# Patient Record
Sex: MALE | Race: BLACK OR AFRICAN AMERICAN | Employment: OTHER | URBAN - METROPOLITAN AREA
[De-identification: names, ages, dates, MRNs, and addresses within clinical notes are randomized per-mention and may not be internally consistent; named-entity substitution may affect disease eponyms.]

---

## 2018-02-21 ENCOUNTER — OFFICE VISIT (OUTPATIENT)
Dept: UROLOGY | Age: 48
End: 2018-02-21

## 2018-02-21 ENCOUNTER — HOSPITAL ENCOUNTER (OUTPATIENT)
Dept: LAB | Age: 48
Discharge: HOME | End: 2018-02-21
Payer: SELF-PAY

## 2018-02-21 VITALS
HEIGHT: 66 IN | TEMPERATURE: 98.2 F | SYSTOLIC BLOOD PRESSURE: 125 MMHG | BODY MASS INDEX: 30.7 KG/M2 | WEIGHT: 191 LBS | OXYGEN SATURATION: 97 % | DIASTOLIC BLOOD PRESSURE: 84 MMHG | HEART RATE: 62 BPM

## 2018-02-21 DIAGNOSIS — R79.89 CREATININE ELEVATION: ICD-10-CM

## 2018-02-21 DIAGNOSIS — Z80.42 FAMILY HISTORY OF PROSTATE CANCER: ICD-10-CM

## 2018-02-21 DIAGNOSIS — R31.29 MICROSCOPIC HEMATURIA: Primary | ICD-10-CM

## 2018-02-21 LAB
BILIRUB UR QL STRIP: NEGATIVE
CREAT SERPL-MCNC: 1.36 MG/DL (ref 0.6–1.3)
GLUCOSE UR-MCNC: NEGATIVE MG/DL
KETONES P FAST UR STRIP-MCNC: NEGATIVE MG/DL
PH UR STRIP: 6 [PH] (ref 4.6–8)
PROT UR QL STRIP: NEGATIVE
SP GR UR STRIP: 1 (ref 1–1.03)
UA UROBILINOGEN AMB POC: NORMAL (ref 0.2–1)
URINALYSIS CLARITY POC: CLEAR
URINALYSIS COLOR POC: YELLOW
URINE BLOOD POC: NORMAL
URINE LEUKOCYTES POC: NEGATIVE
URINE NITRITES POC: NEGATIVE

## 2018-02-21 PROCEDURE — 82565 ASSAY OF CREATININE: CPT | Performed by: UROLOGY

## 2018-02-21 NOTE — PROGRESS NOTES
Jose Clark 56 y.o. male     Mr. Jeyson Pacheco seen today for evaluation of microscopic hematuria and family history of early onset prostate malignancy    Patient was found to have trace heme positive urine on routine employment physical  Patient has no irritative or obstructive voiding symptoms no history of  tract disease, or trauma or surgery  Father developed prostate cancer at age 76  Brother developed prostate cancer at age 62    PSA 1.3 on 23 December 2017    Creatinine 1.56 on 23 December 2017                  creatinine 1.2 in 2013    Renal ultrasound results are not available on care everywhere or chart review    Review of Systems:   CNS: No seizure syncope headaches dizziness visual changes  Respiratory: No wheezing shortness of breath chest pain or coughing  Cardiovascular: No palpitations no angina  Intestinal: No dyspepsia diarrhea or constipation  Urinary: No urgency frequency dysuria or hematuria  Skeletal: No bone or joint pain  Endocrine: No diabetes or thyroid disease  Other:                                                                     Works as a professional     Allergies: No Known Allergies   Medications:    Current Outpatient Prescriptions   Medication Sig Dispense Refill    OTHER Indications: Black seed oil         History reviewed. No pertinent past medical history. History reviewed. No pertinent surgical history.   Family History   Problem Relation Age of Onset    Cancer Mother     Cancer Father     Prostate Cancer Father     Heart Disease Father     Cancer Brother     Prostate Cancer Brother     Cancer Brother     Prostate Cancer Brother     Cancer Brother       Physical Examination: Male in no apparent distress    Abdomen is nontender no palpable masses no organomegaly  Back-no percussion CVA tenderness on either side  No inguinal hernia or adenopathy  Penis is normal  Testes are normal in size shape and consistency bilaterally-no epididymal induration or tenderness on either side  Spermatic cords are palpably normal bilaterally  Scrotum is normal  Prostate by OPAL is rounded, smooth, benign in consistency and nontender-no induration no nodularity  No rectal masses tenderness or induration      Urinalysis: Trace heme/microscopic negative for RBCs    Ultrasound imaging of the kidneys today in the office shows no signs of obstruction, mass lesions, cyst, or stone densities on either side        Impression: Normal urologic findings                        -History of elevated serum creatinine                        -Family history of early onset prostate malignancy    Heme positive urine on dipstick chemistries is a variation of normal and not indicative of urinary tract lesions-patient is asymptomatic from a urinary tract point of view  Ultrasound imaging of the kidneys today shows normal findings elevated creatinine obtained in-December is likely the result of dehydration and not the presence of renal parenchymal disease        Plan: Reassurance-patient is fit for full employment as a professional             Repeat creatinine today-telephone follow-up tomorrow            Yearly PSA testing is indicated because of family history of early onset prostate malignancy    rtc as needed symptoms of urinary tract disease      Sonia Ziegler MD  -electronically signed-    PLEASE NOTE:  This document has been produced using voice recognition software. Unrecognized errors in transcription may be present.

## 2018-02-21 NOTE — PATIENT INSTRUCTIONS

## 2018-02-21 NOTE — MR AVS SNAPSHOT
[address] [facility] [address] [facility] [address] 
[phone] Patient: Albin Silva MRN: [id number] BMM:5/90/3420 Visit Information Date & Time Provider Department Dept. Phone Encounter #  
 2/21/2018  1:00 PM Zohra Reeves, [address] [place] [address] [facility] [id number] Follow-up Instructions Return if symptoms worsen or fail to improve. Follow-up and Disposition History Upcoming Health Maintenance Date Due DTaP/Tdap/Td series (1 - Tdap) 8/30/1992 Influenza Age 5 to Adult 8/1/2017 Allergies as of 2/21/2018  Review Complete On: 2/21/2018 By: Zohra Reeves MD  
 No Known Allergies Current Immunizations  Never Reviewed No immunizations on file. Not reviewed this visit You Were Diagnosed With   
  
 Codes Comments Microscopic hematuria    -  Primary ICD-10-CM: R31.29 ICD-9-CM: 599.72 Family history of prostate cancer     ICD-10-CM: Z80.42 
ICD-9-CM: V16.42 Creatinine elevation     ICD-10-CM: R79.89 ICD-9-CM: 790.99 Vitals BP Pulse Temp Height(growth percentile) Weight(growth percentile) SpO2  
 125/84 (BP 1 Location: Left arm, BP Patient Position: Sitting) 62 98.2 °F (36.8 °C) 5' 6\" (1.676 m) 191 lb (86.6 kg) 97% BMI Smoking Status 30.83 kg/m2 Never Smoker BMI and BSA Data Body Mass Index Body Surface Area  
 30.83 kg/m 2 2.01 m 2 Preferred Pharmacy Pharmacy Name Phone [facility] [address] [phone] Your Updated Medication List  
  
   
This list is accurate as of 2/21/18  2:00 PM.  Always use your most recent med list.  
  
  
  
  
 OTHER Indications: Black seed oil We Performed the Following AMB POC URINALYSIS DIP STICK AUTO W/O MICRO [08073 CPT(R)] AMB POC US Adriana Bumps [id number] CPT(R)] COLLECTION VENOUS BLOOD,VENIPUNCTURE [id number] CPT(R)] Follow-up Instructions Return if symptoms worsen or fail to improve. Patient Instructions Blood in the Urine: Care Instructions Your Care Instructions Blood in the urine, or hematuria, may make the urine look red, brown, or pink. There may be blood every time you urinate or just from time to time. You cannot always see blood in the urine, but it will show up in a urine test. 
Blood in the urine may be serious. It should always be checked by a doctor. Your doctor may recommend more tests, including an X-ray, a CT scan, or a cystoscopy (which lets a doctor look inside the urethra and bladder). Blood in the urine can be a sign of another problem. Common causes are bladder infections and kidney stones. An injury to your groin or your genital area can also cause bleeding in the urinary tract. Very hard exercise-such as running a marathon-can cause blood in the urine. Blood in the urine can also be a sign of kidney disease or cancer in the bladder or kidney. Many cases of blood in the urine are caused by a harmless condition that runs in families. This is called benign familial hematuria. It does not need any treatment. Sometimes your urine may look red or brown even though it does not contain blood. For example, not getting enough fluids (dehydration), taking certain medicines, or having a liver problem can change the color of your urine. Eating foods such as beets, rhubarb, or blackberries or foods with red food coloring can make your urine look red or pink. Follow-up care is a key part of your treatment and safety. Be sure to make and go to all appointments, and call your doctor if you are having problems. It's also a good idea to know your test results and keep a list of the medicines you take. When should you call for help? Call your doctor now or seek immediate medical care if: · You have symptoms of a urinary infection. For example: ¨ You have pus in your urine. ¨ You have pain in your back just below your rib cage. This is called flank pain. ¨ You have a fever, chills, or body aches. ¨ It hurts to urinate. ¨ You have groin or belly pain. · You have more blood in your urine. Watch closely for changes in your health, and be sure to contact your doctor if: 
· You have new urination problems. · You do not get better as expected. Where can you learn more? Go to http://brendon-valeri.info/. Enter H480 in the search box to learn more about \"Blood in the Urine: Care Instructions. \" Current as of: May 12, 2017 Content Version: 11.4 © 2609-9957 DrAvailable. Care instructions adapted under license by [employer] (which disclaims liability or warranty for this information). If you have questions about a medical condition or this instruction, always ask your healthcare professional. [address] any warranty or liability for your use of this information. Patient Instructions History Introducing [facility]! Cherrie Gonzalez introduces [employer] patient portal. Now you can access parts of your medical record, email your doctor's office, and request medication refills online. 1. In your internet browser, go to [email or web address]. 2. Click on the First Time User? Click Here link in the Sign In box. You will see the New Member Sign Up page. 3. Enter your [employer] Access Code exactly as it appears below. You will not need to use this code after youve completed the sign-up process. If you do not sign up before the expiration date, you must request a new code. · [employer] Access Code: [facility] Expires: 5/22/2018  1:01 PM 
 
4. Enter the last four digits of your Social Security Number (xxxx) and Date of Birth (mm/dd/yyyy) as indicated and click Submit.  You will be taken to the next sign-up page. 5. Create a [employer] ID. This will be your [employer] login ID and cannot be changed, so think of one that is secure and easy to remember. 6. Create a [employer] password. You can change your password at any time. 7. Enter your Password Reset Question and Answer. This can be used at a later time if you forget your password. 8. Enter your e-mail address. You will receive e-mail notification when new information is available in [address] 9. Click Sign Up. You can now view and download portions of your medical record. 10. Click the Download Summary menu link to download a portable copy of your medical information. If you have questions, please visit the Frequently Asked Questions section of the [employer] website. Remember, [employer] is NOT to be used for urgent needs. For medical emergencies, dial 911. Now available from your iPhone and Android! Please provide this summary of care documentation to your next provider. Your primary care clinician is listed as Kaila Sanabria. If you have any questions after today's visit, please call [phone].

## 2018-02-21 NOTE — PROGRESS NOTES
Mr. El Martinez has a reminder for a \"due or due soon\" health maintenance. I have asked that he contact his primary care provider for follow-up on this health maintenance. RBV Per Dr. Freda Pike draw lab today for Creatinine for Elevated Creatinine.

## 2018-11-26 ENCOUNTER — OFFICE VISIT (OUTPATIENT)
Dept: PULMONOLOGY | Age: 48
End: 2018-11-26

## 2018-11-26 VITALS
DIASTOLIC BLOOD PRESSURE: 80 MMHG | WEIGHT: 193 LBS | HEIGHT: 66 IN | OXYGEN SATURATION: 97 % | HEART RATE: 80 BPM | TEMPERATURE: 98.6 F | RESPIRATION RATE: 19 BRPM | SYSTOLIC BLOOD PRESSURE: 130 MMHG | BODY MASS INDEX: 31.02 KG/M2

## 2018-11-26 DIAGNOSIS — R59.0 MEDIASTINAL ADENOPATHY: ICD-10-CM

## 2018-11-26 DIAGNOSIS — D86.9 SARCOIDOSIS: ICD-10-CM

## 2018-11-26 DIAGNOSIS — R06.02 SOB (SHORTNESS OF BREATH): Primary | ICD-10-CM

## 2018-11-26 RX ORDER — ALBUTEROL SULFATE 90 UG/1
2 AEROSOL, METERED RESPIRATORY (INHALATION)
Qty: 1 INHALER | Refills: 3 | Status: SHIPPED | OUTPATIENT
Start: 2018-11-26 | End: 2019-09-26 | Stop reason: SDUPTHER

## 2018-11-26 NOTE — PROGRESS NOTES
Chief Complaint Patient presents with  Sarcoidosis  
  referred by Roger Carvalho 1. Have you been to the ER, urgent care clinic since your last visit? Hospitalized since your last visit? No 
 
2. Have you seen or consulted any other health care providers outside of the [address] [place] [address] since your last visit? Include any pap smears or colon screening.  No

## 2018-11-26 NOTE — PROGRESS NOTES
HISTORY OF PRESENT ILLNESS Emi Caballero is a 53 y.o. male referred for shortness of breath. Pt carries a diagnosis of Sarcoidosis although he does not recall of any biopsies done. He started complaining of gradually progressive shortness of breath about 5 years ago, associated with a dry cough. SOB is episodic but is sometimes noted after walking up 2 flights of stairs. Pt denies wheezing or chest pain. No orthopnea, pedal edema or PND. Pt states he is fairly active but does not participate in a regular exercise program. He also admits to a 20 lb weight gain in the last 5 years. Outside records reviewed, pt had a CT in 2013 significant for mediastinal adenopathy and parenchymal nodules, see below. Review of Systems Constitutional: Positive for malaise/fatigue. Negative for chills, diaphoresis, fever and weight loss. Weight gain HENT: Negative for congestion, ear discharge, ear pain, hearing loss, nosebleeds, sinus pain, sore throat and tinnitus. Eyes: Negative for blurred vision, double vision, photophobia, pain, discharge and redness. Respiratory: Positive for shortness of breath. Negative for cough, hemoptysis, sputum production, wheezing and stridor. Cardiovascular: Negative for chest pain, palpitations, orthopnea, claudication, leg swelling and PND. Gastrointestinal: Negative for abdominal pain, blood in stool, constipation, diarrhea, heartburn, melena, nausea and vomiting. Genitourinary: Negative for dysuria, flank pain, frequency, hematuria and urgency. Musculoskeletal: Negative for back pain, falls, joint pain, myalgias and neck pain. Skin: Negative for itching and rash. Neurological: Negative for dizziness, tingling, tremors, sensory change, speech change, focal weakness, seizures, loss of consciousness, weakness and headaches. Endo/Heme/Allergies: Negative for environmental allergies and polydipsia. Does not bruise/bleed easily. Psychiatric/Behavioral: Negative for depression, hallucinations, memory loss, substance abuse and suicidal ideas. The patient is not nervous/anxious and does not have insomnia. Physical Exam  
Constitutional: He is oriented to person, place, and time. He appears well-developed. No distress. Overweight HENT:  
Head: Normocephalic and atraumatic. Nose: Nose normal.  
Mouth/Throat: Oropharynx is clear and moist. No oropharyngeal exudate. Eyes: Conjunctivae and EOM are normal. Pupils are equal, round, and reactive to light. Right eye exhibits no discharge. Left eye exhibits no discharge. No scleral icterus. Neck: Normal range of motion. No JVD present. No tracheal deviation present. No thyromegaly present. Cardiovascular: Normal rate, regular rhythm, normal heart sounds and intact distal pulses. Exam reveals no gallop and no friction rub. No murmur heard. Pulmonary/Chest: Breath sounds normal. No stridor. No respiratory distress. He has no wheezes. He has no rales. He exhibits no tenderness. Abdominal: Soft. He exhibits no mass. There is no tenderness. There is no rebound. Musculoskeletal: He exhibits no edema, tenderness or deformity. Lymphadenopathy:  
  He has no cervical adenopathy. Neurological: He is alert and oriented to person, place, and time. Coordination normal.  
Skin: Skin is warm and dry. No rash noted. He is not diaphoretic. No erythema. No pallor. Psychiatric: He has a normal mood and affect. His behavior is normal. Judgment and thought content normal.  
  
CT CHEST W/O CONTRAST9/24/2013 [facility] Result Impression Impression: 1. Multiple thoracic inlet, mediastinum, and bilateral hilar lymphadenopathy as described above. 2. Multiple scattered pulmonary nodules in both lung fields more in the right lung. 3. Tree on bud appearance  neovascularization and miliary nodules in the right middle and lower lobes. Possible etiologies include sarcoidosis, lymphoma, granulomatous disease such as TB and fungal infection. Clinical correlation is recommended. Result Narrative History:44year-old male with history of enlarged lymph nodes. CPT [id number] Comparison:Chest x-ray of  8/14/13. Technique: Helical scan was performed through the chest without contrast.  Coronal and sagittal reconstructions were performed. LVC:385.0. Findings: Lack of intravenous contrast lessens sensitivity for detecting hilar and mediastinal adenopathy.  .   
 
The thyroid is normal in size and density. There is a right supraclavicular lymph node measuring 1.1 x 1 cm.  Series 2 image #1. Multiple thoracic inlet and mediastinum lymphadenopathy. The largest pretracheal lymph node measures 2.5 x 2.2 x 4.1 cm in AP, transverse and CC dimension.  In axial and sagittal view. The combination of subcarinal lymph node measures 2.4 x 2.7 cm. The largest of the pulmonary window lymph node measures 1.5 x 1.9 cm. Multiple hilar lymph nodes not well defined due to lack of IV contrast.   
There are multiple tiny pulmonary nodules scattered in the lung field. Right upper lobe pulmonary nodules: In series 3 image #20 measures 6.5 x 4.8 mm. In image #21 measures 5 mm and there are two 5 mm lymph nodes in the mediastinum region in right upper lobe image #26. The largest right lower lobe nodule measures 6.9 x 11 mm in image #28. There are generalized tree on bud neovascularization in the right middle and upper lobes with multiple miliary nodules. There is patchy atelectasis in the right lower lobe costophrenic region. The nodules in the left lung has small and the left lung field is clear. No pneumothorax, no pleural effusion. The heart size is normal.  The aorta and pulmonary arteries are normal in size.  
The bone skeleton is normal. 
 
The visualized liver, spleen, pancreas, adrenal glands, and superior aspect of the kidneys are normal. 
 No hiatal hernia. Status Results Details  
    
 
  
PFT: normal flows with normal FEV1 ratio. FEV1 increased by 13% after bronchodilator. Normal lung volume and DLCO Chest PA L personal review: no acute infiltrates, perihilar prominence ASSESSMENT and PLAN Encounter Diagnoses Name Primary?  SOB (shortness of breath) Yes  Sarcoidosis  Mediastinal adenopathy Shortness of breath of unclear etiology however spirometry does reveal significant bronchodilator response despite normal flows. Pt may have bronchospasm, whether this is associated with Sarcoidosis. Although pt carries a diagnosis of Sarcoidosis, I do not see evidence of a tissue diagnosis. Although CT changes could be explained by this, other DDx also include fungal or other granulomatous disease. Discussed diagnosis and indications for treatment of Sarcoidosis, pt is reluctant to undergo any type of biopsy at this time. He does agree to have a CT chest to follow up on above changes. Order written, see below. Will call pt with CT results. Will start pt on prn Albuterol. Rx written and pt instructed on inhaler use. RTC in 3 months.

## 2018-11-28 ENCOUNTER — HOSPITAL ENCOUNTER (OUTPATIENT)
Dept: CT IMAGING | Age: 48
Discharge: HOME | End: 2018-11-28
Attending: INTERNAL MEDICINE
Payer: SELF-PAY

## 2018-11-28 DIAGNOSIS — R59.0 MEDIASTINAL ADENOPATHY: ICD-10-CM

## 2018-11-28 LAB — CREAT UR-MCNC: 1.5 MG/DL (ref 0.6–1.3)

## 2018-11-28 PROCEDURE — 71260 CT THORAX DX C+: CPT

## 2018-11-28 PROCEDURE — 74011636320 HC RX REV CODE- 636/320: Performed by: INTERNAL MEDICINE

## 2018-11-28 PROCEDURE — 82565 ASSAY OF CREATININE: CPT

## 2018-11-28 RX ADMIN — IOPAMIDOL 80 ML: 612 INJECTION, SOLUTION INTRAVENOUS at 07:31

## 2019-02-21 ENCOUNTER — OFFICE VISIT (OUTPATIENT)
Dept: PULMONOLOGY | Age: 49
End: 2019-02-21

## 2019-02-21 ENCOUNTER — HOSPITAL ENCOUNTER (OUTPATIENT)
Dept: LAB | Age: 49
Discharge: HOME | End: 2019-02-21
Payer: SELF-PAY

## 2019-02-21 VITALS
OXYGEN SATURATION: 96 % | BODY MASS INDEX: 31.34 KG/M2 | WEIGHT: 195 LBS | DIASTOLIC BLOOD PRESSURE: 78 MMHG | RESPIRATION RATE: 18 BRPM | TEMPERATURE: 98.1 F | HEIGHT: 66 IN | SYSTOLIC BLOOD PRESSURE: 110 MMHG | HEART RATE: 63 BPM

## 2019-02-21 DIAGNOSIS — R53.83 FATIGUE, UNSPECIFIED TYPE: ICD-10-CM

## 2019-02-21 DIAGNOSIS — R91.1 LUNG NODULE: ICD-10-CM

## 2019-02-21 DIAGNOSIS — R53.83 FATIGUE, UNSPECIFIED TYPE: Primary | ICD-10-CM

## 2019-02-21 LAB
ALBUMIN SERPL-MCNC: 4.3 G/DL (ref 3.4–5)
ALBUMIN/GLOB SERPL: 1.3 {RATIO} (ref 0.8–1.7)
ALP SERPL-CCNC: 55 U/L (ref 45–117)
ALT SERPL-CCNC: 56 U/L (ref 16–61)
ANION GAP SERPL CALC-SCNC: 2 MMOL/L (ref 3–18)
AST SERPL-CCNC: 25 U/L (ref 15–37)
BASOPHILS # BLD: 0 K/UL (ref 0–0.1)
BASOPHILS NFR BLD: 1 % (ref 0–2)
BILIRUB SERPL-MCNC: 0.6 MG/DL (ref 0.2–1)
BUN SERPL-MCNC: 14 MG/DL (ref 7–18)
BUN/CREAT SERPL: 10 (ref 12–20)
CALCIUM SERPL-MCNC: 9.3 MG/DL (ref 8.5–10.1)
CHLORIDE SERPL-SCNC: 104 MMOL/L (ref 100–108)
CO2 SERPL-SCNC: 32 MMOL/L (ref 21–32)
CREAT SERPL-MCNC: 1.4 MG/DL (ref 0.6–1.3)
DIFFERENTIAL METHOD BLD: ABNORMAL
EOSINOPHIL # BLD: 0.1 K/UL (ref 0–0.4)
EOSINOPHIL NFR BLD: 2 % (ref 0–5)
ERYTHROCYTE [DISTWIDTH] IN BLOOD BY AUTOMATED COUNT: 13.9 % (ref 11.6–14.5)
GLOBULIN SER CALC-MCNC: 3.2 G/DL (ref 2–4)
GLUCOSE SERPL-MCNC: 86 MG/DL (ref 74–99)
HCT VFR BLD AUTO: 46.9 % (ref 36–48)
HGB BLD-MCNC: 15.9 G/DL (ref 13–16)
LYMPHOCYTES # BLD: 1.9 K/UL (ref 0.9–3.6)
LYMPHOCYTES NFR BLD: 45 % (ref 21–52)
MCH RBC QN AUTO: 27.4 PG (ref 24–34)
MCHC RBC AUTO-ENTMCNC: 33.9 G/DL (ref 31–37)
MCV RBC AUTO: 80.9 FL (ref 74–97)
MONOCYTES # BLD: 0.3 K/UL (ref 0.05–1.2)
MONOCYTES NFR BLD: 7 % (ref 3–10)
NEUTS SEG # BLD: 1.9 K/UL (ref 1.8–8)
NEUTS SEG NFR BLD: 45 % (ref 40–73)
PLATELET # BLD AUTO: 231 K/UL (ref 135–420)
PMV BLD AUTO: 10.4 FL (ref 9.2–11.8)
POTASSIUM SERPL-SCNC: 4.9 MMOL/L (ref 3.5–5.5)
PROT SERPL-MCNC: 7.5 G/DL (ref 6.4–8.2)
RBC # BLD AUTO: 5.8 M/UL (ref 4.7–5.5)
SODIUM SERPL-SCNC: 138 MMOL/L (ref 136–145)
WBC # BLD AUTO: 4.1 K/UL (ref 4.6–13.2)

## 2019-02-21 PROCEDURE — 36415 COLL VENOUS BLD VENIPUNCTURE: CPT

## 2019-02-21 PROCEDURE — 80053 COMPREHEN METABOLIC PANEL: CPT

## 2019-02-21 PROCEDURE — 85025 COMPLETE CBC W/AUTO DIFF WBC: CPT

## 2019-02-21 NOTE — PROGRESS NOTES
HISTORY OF PRESENT ILLNESS Sivakumar Terrell is a 53 y.o. male referred for shortness of breath. Pt carries a diagnosis of Sarcoidosis although he does not recall of any biopsies done. Pt was told of Sarcoidosis after complaining of gradually progressive shortness of breath about 5 years ago, associated with a dry cough. CT done then showed mediastinal adenopathy. SOB was episodic but sometimes noted after walking up 2 flights of stairs. Pt denied wheezing or chest pain. All these symptoms resolved with time. No orthopnea, pedal edema or PND. Pt states he is fairly active but does not participate in a regular exercise program. He also admits to a 20 lb weight gain in the last 5 years. Repeat CT was done, see below. Pt does complain of easy fatigue without actual SOB or wheezing. Review of records show subtle increase in Creatinine over time. Review of Systems Constitutional: Positive for malaise/fatigue. Negative for chills, diaphoresis, fever and weight loss. Weight gain HENT: Negative for congestion, ear discharge, ear pain, hearing loss, nosebleeds, sinus pain, sore throat and tinnitus. Eyes: Negative for blurred vision, double vision, photophobia, pain, discharge and redness. Respiratory: Positive for shortness of breath. Negative for cough, hemoptysis, sputum production, wheezing and stridor. Cardiovascular: Negative for chest pain, palpitations, orthopnea, claudication, leg swelling and PND. Gastrointestinal: Negative for abdominal pain, blood in stool, constipation, diarrhea, heartburn, melena, nausea and vomiting. Genitourinary: Negative for dysuria, flank pain, frequency, hematuria and urgency. Musculoskeletal: Negative for back pain, falls, joint pain, myalgias and neck pain. Skin: Negative for itching and rash.   
Neurological: Negative for dizziness, tingling, tremors, sensory change, speech change, focal weakness, seizures, loss of consciousness, weakness and headaches. Endo/Heme/Allergies: Negative for environmental allergies and polydipsia. Does not bruise/bleed easily. Psychiatric/Behavioral: Negative for depression, hallucinations, memory loss, substance abuse and suicidal ideas. The patient is not nervous/anxious and does not have insomnia. Past Medical History:  
Diagnosis Date  Sarcoidosis History reviewed. No pertinent surgical history. Current Outpatient Medications on File Prior to Visit Medication Sig Dispense Refill  albuterol (PROVENTIL HFA, VENTOLIN HFA, PROAIR HFA) 90 mcg/actuation inhaler Take 2 Puffs by inhalation every six (6) hours as needed for Wheezing. 1 Inhaler 3  
 OTHER Indications: Black seed oil No current facility-administered medications on file prior to visit. No Known Allergies Family History Problem Relation Age of Onset  Cancer Mother  Cancer Father  Prostate Cancer Father  Heart Disease Father  Cancer Brother  Prostate Cancer Brother  Cancer Brother  Prostate Cancer Brother  Cancer Brother Social History Socioeconomic History  Marital status:  Spouse name: Not on file  Number of children: Not on file  Years of education: Not on file  Highest education level: Not on file Social Needs  Financial resource strain: Not on file  Food insecurity - worry: Not on file  Food insecurity - inability: Not on file  Transportation needs - medical: Not on file  Transportation needs - non-medical: Not on file Occupational History  Not on file Tobacco Use  Smoking status: Never Smoker  Smokeless tobacco: Never Used Substance and Sexual Activity  Alcohol use: No  
 Drug use: No  
 Sexual activity: Yes Other Topics Concern  Not on file Social History Narrative ** Merged History Encounter ** Blood pressure 110/78, pulse 63, temperature 98.1 °F (36.7 °C), temperature source Oral, resp. rate 18, height 5' 6\" (1.676 m), weight 88.5 kg (195 lb), SpO2 96 %. Physical Exam  
Constitutional: He is oriented to person, place, and time. He appears well-developed. No distress. Overweight HENT:  
Head: Normocephalic and atraumatic. Nose: Nose normal.  
Mouth/Throat: Oropharynx is clear and moist. No oropharyngeal exudate. Eyes: Conjunctivae and EOM are normal. Pupils are equal, round, and reactive to light. Right eye exhibits no discharge. Left eye exhibits no discharge. No scleral icterus. Neck: Normal range of motion. No JVD present. No tracheal deviation present. No thyromegaly present. Cardiovascular: Normal rate, regular rhythm, normal heart sounds and intact distal pulses. Exam reveals no gallop and no friction rub. No murmur heard. Pulmonary/Chest: Breath sounds normal. No stridor. No respiratory distress. He has no wheezes. He has no rales. He exhibits no tenderness. Abdominal: Soft. He exhibits no mass. There is no tenderness. There is no rebound and no guarding. Musculoskeletal: He exhibits no edema, tenderness or deformity. Lymphadenopathy:  
  He has no cervical adenopathy. Neurological: He is alert and oriented to person, place, and time. Coordination normal.  
Skin: Skin is warm and dry. No rash noted. He is not diaphoretic. No erythema. No pallor. Psychiatric: He has a normal mood and affect. His behavior is normal. Judgment and thought content normal.  
  
CT CHEST W/O CONTRAST9/24/2013 [facility] Result Impression Impression: 1. Multiple thoracic inlet, mediastinum, and bilateral hilar lymphadenopathy as described above. 2. Multiple scattered pulmonary nodules in both lung fields more in the right lung. 3. Tree on bud appearance  neovascularization and miliary nodules in the right middle and lower lobes. Possible etiologies include sarcoidosis, lymphoma, granulomatous disease such as TB and fungal infection. Clinical correlation is recommended. Result Narrative History:44year-old male with history of enlarged lymph nodes. CPT [id number] Comparison:Chest x-ray of  8/14/13. Technique: Helical scan was performed through the chest without contrast.  Coronal and sagittal reconstructions were performed. IYM:802.4. Findings: Lack of intravenous contrast lessens sensitivity for detecting hilar and mediastinal adenopathy.  .   
 
The thyroid is normal in size and density. There is a right supraclavicular lymph node measuring 1.1 x 1 cm.  Series 2 image #1. Multiple thoracic inlet and mediastinum lymphadenopathy. The largest pretracheal lymph node measures 2.5 x 2.2 x 4.1 cm in AP, transverse and CC dimension.  In axial and sagittal view. The combination of subcarinal lymph node measures 2.4 x 2.7 cm. The largest of the pulmonary window lymph node measures 1.5 x 1.9 cm. Multiple hilar lymph nodes not well defined due to lack of IV contrast.   
There are multiple tiny pulmonary nodules scattered in the lung field. Right upper lobe pulmonary nodules: In series 3 image #20 measures 6.5 x 4.8 mm. In image #21 measures 5 mm and there are two 5 mm lymph nodes in the mediastinum region in right upper lobe image #26. The largest right lower lobe nodule measures 6.9 x 11 mm in image #28. There are generalized tree on bud neovascularization in the right middle and upper lobes with multiple miliary nodules. There is patchy atelectasis in the right lower lobe costophrenic region. The nodules in the left lung has small and the left lung field is clear. No pneumothorax, no pleural effusion. The heart size is normal.  The aorta and pulmonary arteries are normal in size. The bone skeleton is normal. 
 
The visualized liver, spleen, pancreas, adrenal glands, and superior aspect of the kidneys are normal. 
No hiatal hernia.   
Status Results Details  
    
 
  
CT Results (most recent): 
 Results from Hospital Encounter encounter on 11/28/18 CT CHEST W CONT Narrative EXAM: CT scan of the chest with contrast. 
 
CLINICAL HISTORY/INDICATION: Mediastinal adenopathy. Beverly Thakkar COMPARISON: None. TECHNIQUE: All CT scans at this facility are performed using dose optimization 
technique as appropriate to a performed exam, to include automated exposure 
control, adjustment of the mA and/or kV according to patient's size (including 
appropriate matching for site-specific examinations), or use of iterative 
reconstruction technique. Beverly Thakkar FINDINGS: 
 
A CT scan of the patient's chest is performed with multiple axial images from 
the thoracic inlet to the costophrenic angles. Intravenous contrast material 
utilized. There is normal opacification of the vascular structures of the 
mediastinum. No evidence for mediastinal or hilar adenopathy is present. The 
caliber of the thoracic aorta is within normal limits. No aneurysm or dissection 
is observed. The heart is normal in size. No pericardial effusion is seen. The 
partially visualized upper abdominal organs are unremarkable. The lobes of the 
thyroid gland appears symmetric. Lung window images reveals in the periphery of 
the right lung on image #9 a 4 mm nodule. On image #30 in the periphery of the 
right lung is a 2 mm nodule. No other nodules infiltrates pleural effusions or 
pneumothoraces are seen. Bone windows show no evidence for a destructive bony 
lesion. Beverly Thakkar Impression IMPRESSION: 
 
No evidence for mediastinal or hilar adenopathy. 2 small less than 4 mm nodules noted in the right lung. No infiltrates pleural effusions or pneumothoraces. The osseous structures are intact. Beverly Thakkar PFT: normal flows with normal FEV1 ratio. FEV1 increased by 13% after bronchodilator. Normal lung volume and DLCO Chest PA L personal review: no acute infiltrates, perihilar prominence ASSESSMENT and PLAN Encounter Diagnoses Name Primary?  Fatigue, unspecified type Yes  Lung nodule Shortness of breath of unclear etiology however spirometry does reveal significant bronchodilator response despite normal flows. Pt may have bronchospasm, ?undiagnosed Asthma. Although pt carries a diagnosis of Sarcoidosis, I do not see evidence of a tissue diagnosis. In addition, adenopathy has resolved on repeat CT and may have been due to infection rather than Sarcoidosis. Discussed diagnosis and indications for treatment of Sarcoidosis, pt is still reluctant to undergo any type of biopsy. Will check CBC and CMP to investigate possible causes of easy fatigue. Will continue prn Albuterol. RTC in 3 months. Will call pt with results of bloodwork.

## 2019-02-21 NOTE — PROGRESS NOTES
Chief Complaint Patient presents with  Sarcoidosis  
  follow up form 11/26/2018; CT 11/28/2018  Shortness of Breath  Other  
  mediastinal adenopathy 1. Have you been to the ER, urgent care clinic since your last visit? Hospitalized since your last visit? No 
 
2. Have you seen or consulted any other health care providers outside of the [address] [place] [address] since your last visit? Include any pap smears or colon screening.  No

## 2019-03-14 NOTE — LETTER
11/26/2018 5:05 PM 
 
Patient:  Ana Rosa Palmer YOB: 1970 Date of Visit: 11/26/2018 Dear Ruth Navarro MD 
[address] [place] [address] VIA Facsimile: [phone] 
 : Thank you for referring Mr. Fuentes Cohn to me for evaluation/treatment. Attached is my note with the relevant portions of my assessment and plan of care. If you have questions, please do not hesitate to call me. I look forward to following Mr. Chente Fry along with you. Sincerely, Roberta Man MD 
 
 Patient arrived to IR procedure room 2.

## 2019-09-26 ENCOUNTER — OFFICE VISIT (OUTPATIENT)
Dept: PULMONOLOGY | Age: 49
End: 2019-09-26

## 2019-09-26 ENCOUNTER — HOSPITAL ENCOUNTER (OUTPATIENT)
Dept: LAB | Age: 49
Discharge: HOME | End: 2019-09-26
Payer: SELF-PAY

## 2019-09-26 VITALS
DIASTOLIC BLOOD PRESSURE: 90 MMHG | WEIGHT: 188 LBS | TEMPERATURE: 98.7 F | HEART RATE: 60 BPM | SYSTOLIC BLOOD PRESSURE: 147 MMHG | HEIGHT: 66 IN | OXYGEN SATURATION: 99 % | RESPIRATION RATE: 17 BRPM | BODY MASS INDEX: 30.22 KG/M2

## 2019-09-26 DIAGNOSIS — R59.0 HILAR ADENOPATHY: ICD-10-CM

## 2019-09-26 DIAGNOSIS — I10 ESSENTIAL HYPERTENSION: ICD-10-CM

## 2019-09-26 DIAGNOSIS — N18.9 CHRONIC KIDNEY DISEASE, UNSPECIFIED CKD STAGE: ICD-10-CM

## 2019-09-26 DIAGNOSIS — J98.01 BRONCHOSPASM: Primary | ICD-10-CM

## 2019-09-26 DIAGNOSIS — Z23 ENCOUNTER FOR IMMUNIZATION: ICD-10-CM

## 2019-09-26 LAB
ANION GAP SERPL CALC-SCNC: 7 MMOL/L (ref 3–18)
BUN SERPL-MCNC: 10 MG/DL (ref 7–18)
BUN/CREAT SERPL: 8 (ref 12–20)
CALCIUM SERPL-MCNC: 9.6 MG/DL (ref 8.5–10.1)
CHLORIDE SERPL-SCNC: 107 MMOL/L (ref 100–111)
CO2 SERPL-SCNC: 27 MMOL/L (ref 21–32)
CREAT SERPL-MCNC: 1.27 MG/DL (ref 0.6–1.3)
GLUCOSE SERPL-MCNC: 98 MG/DL (ref 74–99)
POTASSIUM SERPL-SCNC: 4.1 MMOL/L (ref 3.5–5.5)
SODIUM SERPL-SCNC: 141 MMOL/L (ref 136–145)

## 2019-09-26 PROCEDURE — 36415 COLL VENOUS BLD VENIPUNCTURE: CPT

## 2019-09-26 PROCEDURE — 80048 BASIC METABOLIC PNL TOTAL CA: CPT

## 2019-09-26 RX ORDER — ALBUTEROL SULFATE 90 UG/1
2 AEROSOL, METERED RESPIRATORY (INHALATION)
Qty: 1 INHALER | Refills: 5 | Status: SHIPPED | OUTPATIENT
Start: 2019-09-26

## 2019-09-26 RX ORDER — FLUTICASONE FUROATE AND VILANTEROL 100; 25 UG/1; UG/1
1 POWDER RESPIRATORY (INHALATION) DAILY
Qty: 2 INHALER | Refills: 0 | Status: SHIPPED | COMMUNITY
Start: 2019-09-26

## 2019-09-26 NOTE — PROGRESS NOTES
HISTORY OF PRESENT ILLNESS  Anish Clark is a 51 y.o. male referred for shortness of breath. Pt was diagnosed with Sarcoidosis years ago although he does not recall of any biopsies done. Pt was told of Sarcoidosis after complaining of gradually progressive shortness of breath about 5 years ago, associated with a dry cough. CT done then showed mediastinal adenopathy. Repeat CT showed resolution of adenopathy. SOB was episodic but sometimes noted after walking up 2 flights of stairs. Pt denied wheezing or chest pain. All these symptoms improved with rest and Albuterol which pt uses at least once daily. Pt does complain of easy fatigue and shortness of breath with exertion. Review of records show subtle increase in Creatinine over time. Review of Systems   Constitutional: Positive for malaise/fatigue. Negative for chills, diaphoresis, fever and weight loss. Weight gain   HENT: Negative for congestion, ear discharge, ear pain, hearing loss, nosebleeds, sinus pain, sore throat and tinnitus. Eyes: Negative for blurred vision, double vision, photophobia, pain, discharge and redness. Respiratory: Positive for shortness of breath. Negative for cough, hemoptysis, sputum production, wheezing and stridor. Cardiovascular: Negative for chest pain, palpitations, orthopnea, claudication, leg swelling and PND. Gastrointestinal: Negative for abdominal pain, blood in stool, constipation, diarrhea, heartburn, melena, nausea and vomiting. Genitourinary: Negative for dysuria, flank pain, frequency, hematuria and urgency. Musculoskeletal: Negative for back pain, falls, joint pain, myalgias and neck pain. Skin: Negative for itching and rash. Neurological: Negative for dizziness, tingling, tremors, sensory change, speech change, focal weakness, seizures, loss of consciousness, weakness and headaches. Endo/Heme/Allergies: Negative for environmental allergies and polydipsia. Does not bruise/bleed easily. Psychiatric/Behavioral: Negative for depression, hallucinations, memory loss, substance abuse and suicidal ideas. The patient is not nervous/anxious and does not have insomnia. Past Medical History:   Diagnosis Date    Sarcoidosis      History reviewed. No pertinent surgical history. Current Outpatient Medications on File Prior to Visit   Medication Sig Dispense Refill    multivitamin, tx-iron-ca-min (THERA-M W/ IRON) 9 mg iron-400 mcg tab tablet Take 1 Tab by mouth daily.  OTHER Indications: Black seed oil       No current facility-administered medications on file prior to visit.       No Known Allergies  Family History   Problem Relation Age of Onset    Cancer Mother     Cancer Father     Prostate Cancer Father     Heart Disease Father     Cancer Brother     Prostate Cancer Brother     Cancer Brother     Prostate Cancer Brother     Cancer Brother      Social History     Socioeconomic History    Marital status:      Spouse name: Not on file    Number of children: Not on file    Years of education: Not on file    Highest education level: Not on file   Occupational History    Not on file   Social Needs    Financial resource strain: Not on file    Food insecurity:     Worry: Not on file     Inability: Not on file    Transportation needs:     Medical: Not on file     Non-medical: Not on file   Tobacco Use    Smoking status: Never Smoker    Smokeless tobacco: Never Used   Substance and Sexual Activity    Alcohol use: No    Drug use: No    Sexual activity: Yes   Lifestyle    Physical activity:     Days per week: Not on file     Minutes per session: Not on file    Stress: Not on file   Relationships    Social connections:     Talks on phone: Not on file     Gets together: Not on file     Attends [employer] service: Not on file     Active member of club or organization: Not on file     Attends meetings of clubs or organizations: Not on file     Relationship status: Not on file  Intimate partner violence:     Fear of current or ex partner: Not on file     Emotionally abused: Not on file     Physically abused: Not on file     Forced sexual activity: Not on file   Other Topics Concern    Not on file   Social History Narrative    ** Merged History Encounter **          Blood pressure 147/90, pulse 60, temperature 98.7 °F (37.1 °C), temperature source Oral, resp. rate 17, height 5' 6\" (1.676 m), weight 85.3 kg (188 lb), SpO2 99 %. Physical Exam   Constitutional: He is oriented to person, place, and time. He appears well-developed. No distress. Overweight    HENT:   Head: Normocephalic and atraumatic. Nose: Nose normal.   Mouth/Throat: Oropharynx is clear and moist. No oropharyngeal exudate. Eyes: Pupils are equal, round, and reactive to light. Conjunctivae and EOM are normal. Right eye exhibits no discharge. Left eye exhibits no discharge. No scleral icterus. Neck: Normal range of motion. No JVD present. No tracheal deviation present. No thyromegaly present. Cardiovascular: Normal rate, regular rhythm, normal heart sounds and intact distal pulses. Exam reveals no gallop and no friction rub. No murmur heard. Pulmonary/Chest: Breath sounds normal. No stridor. No respiratory distress. He has no wheezes. He has no rales. He exhibits no tenderness. Abdominal: Soft. He exhibits no mass. There is no tenderness. There is no rebound and no guarding. Musculoskeletal: He exhibits no edema, tenderness or deformity. Lymphadenopathy:     He has no cervical adenopathy. Neurological: He is alert and oriented to person, place, and time. Coordination normal.   Skin: Skin is warm and dry. No rash noted. He is not diaphoretic. No erythema. No pallor. Psychiatric: He has a normal mood and affect. His behavior is normal. Judgment and thought content normal.      CT CHEST W/O CONTRAST9/24/2013  [facility]  Result Impression   Impression:   1.  Multiple thoracic inlet, mediastinum, and bilateral hilar lymphadenopathy as described above. 2. Multiple scattered pulmonary nodules in both lung fields more in the right lung. 3. Tree on bud appearance  neovascularization and miliary nodules in the right middle and lower lobes. Possible etiologies include sarcoidosis, lymphoma, granulomatous disease such as TB and fungal infection. Clinical correlation is recommended. Result Narrative   History:44year-old male with history of enlarged lymph nodes. CPT [id number]    Comparison:Chest x-ray of  8/14/13. Technique: Helical scan was performed through the chest without contrast.  Coronal and sagittal reconstructions were performed. LFW:174.6. Findings: Lack of intravenous contrast lessens sensitivity for detecting hilar and mediastinal adenopathy.  .      The thyroid is normal in size and density. There is a right supraclavicular lymph node measuring 1.1 x 1 cm.  Series 2 image #1. Multiple thoracic inlet and mediastinum lymphadenopathy. The largest pretracheal lymph node measures 2.5 x 2.2 x 4.1 cm in AP, transverse and CC dimension.  In axial and sagittal view. The combination of subcarinal lymph node measures 2.4 x 2.7 cm. The largest of the pulmonary window lymph node measures 1.5 x 1.9 cm. Multiple hilar lymph nodes not well defined due to lack of IV contrast.    There are multiple tiny pulmonary nodules scattered in the lung field. Right upper lobe pulmonary nodules: In series 3 image #20 measures 6.5 x 4.8 mm. In image #21 measures 5 mm and there are two 5 mm lymph nodes in the mediastinum region in right upper lobe image #26. The largest right lower lobe nodule measures 6.9 x 11 mm in image #28. There are generalized tree on bud neovascularization in the right middle and upper lobes with multiple miliary nodules. There is patchy atelectasis in the right lower lobe costophrenic region.   The nodules in the left lung has small and the left lung field is clear.  No pneumothorax, no pleural effusion. The heart size is normal.  The aorta and pulmonary arteries are normal in size. The bone skeleton is normal.    The visualized liver, spleen, pancreas, adrenal glands, and superior aspect of the kidneys are normal.  No hiatal hernia. Status Results Details             CT Results (most recent):  Results from Hospital Encounter encounter on 11/28/18   CT CHEST W CONT    Narrative EXAM: CT scan of the chest with contrast.    CLINICAL HISTORY/INDICATION: Mediastinal adenopathy. .    COMPARISON: None. TECHNIQUE: All CT scans at this facility are performed using dose optimization  technique as appropriate to a performed exam, to include automated exposure  control, adjustment of the mA and/or kV according to patient's size (including  appropriate matching for site-specific examinations), or use of iterative  reconstruction technique. Maryse Hairston FINDINGS:    A CT scan of the patient's chest is performed with multiple axial images from  the thoracic inlet to the costophrenic angles. Intravenous contrast material  utilized. There is normal opacification of the vascular structures of the  mediastinum. No evidence for mediastinal or hilar adenopathy is present. The  caliber of the thoracic aorta is within normal limits. No aneurysm or dissection  is observed. The heart is normal in size. No pericardial effusion is seen. The  partially visualized upper abdominal organs are unremarkable. The lobes of the  thyroid gland appears symmetric. Lung window images reveals in the periphery of  the right lung on image #9 a 4 mm nodule. On image #30 in the periphery of the  right lung is a 2 mm nodule. No other nodules infiltrates pleural effusions or  pneumothoraces are seen. Bone windows show no evidence for a destructive bony  lesion. .      Impression IMPRESSION:    No evidence for mediastinal or hilar adenopathy. 2 small less than 4 mm nodules noted in the right lung.   No infiltrates pleural effusions or pneumothoraces. The osseous structures are intact. Shruthi Tamayo PFT: normal flows with normal FEV1 ratio. FEV1 increased by 13% after bronchodilator. Normal lung volume and DLCO  Chest PA L personal review: no acute infiltrates, perihilar prominence  ASSESSMENT and PLAN  Encounter Diagnoses   Name Primary?  Bronchospasm Yes    Hilar adenopathy     Encounter for immunization     Chronic kidney disease, unspecified CKD stage       Shortness of breath of unclear etiology however spirometry does reveal significant bronchodilator response despite normal flows. Start Breo and continue Albuterol. Repeat CMP as pt with gradually increasing Creatinine and HTN. RTC in 4 weeks  Pt initially agreed on flu vaccination, now wants to postpone till next visit.

## 2019-09-26 NOTE — PROGRESS NOTES
Kandace Reis presents today for   Chief Complaint   Patient presents with    Labs       Is someone accompanying this pt? No     Is the patient using any DME equipment during OV? No    -[employer]    Depression Screening:  3 most recent PHQ Screens 2/21/2019   Little interest or pleasure in doing things Not at all   Feeling down, depressed, irritable, or hopeless Not at all   Total Score PHQ 2 0       Learning Assessment:  Learning Assessment 2/21/2018   PRIMARY LEARNER Patient   BARRIERS PRIMARY LEARNER NONE   CO-LEARNER CAREGIVER No   PRIMARY LANGUAGE ENGLISH   LEARNER PREFERENCE PRIMARY LISTENING   ANSWERED BY patient   RELATIONSHIP SELF       Abuse Screening:  No flowsheet data found. Fall Risk  No flowsheet data found. Coordination of Care:  1. Have you been to the ER, urgent care clinic since your last visit? Hospitalized since your last visit? Yes; Name: Figueroa Hernández    2. Have you seen or consulted any other health care providers outside of the [address] [place] [address] since your last visit? Include any pap smears or colon screening.  No

## 2021-01-22 NOTE — PROGRESS NOTES
Verbal Order with read back per Dr. Devon Bejarano MD  For PFT smart panel. AMB POC PFT complete w/ bronchodilator AMB POC PFT complete w/o bronchodilator Gas Dilute/ wash out lung vol w/wo distrib vet & vol 
Diffusing capacity Dr. Devon Bejarano MD will co-sign the orders. COVID-19